# Patient Record
(demographics unavailable — no encounter records)

---

## 2017-07-03 NOTE — ERD
ER Documentation


Chief Complaint


Date/Time


DATE: 7/3/17 


TIME: 14:19


Chief Complaint


urinary retention , blood in urine with abd pain since yesterday





HPI


Patient is a 60-year-old male with no medical problems who presents with blood 

in his urine and difficulty with urination.  The patient says "I cannot pee".  

He said that he noticed blood in his urine on Saturday and then last night he 

could not have a urination.  He was unable to urinate today either and feels 

like his bladder is full.  He is having pain over his bladder.  He has no 

fevers.  He tried "a pill" which he believes was Flomax that he got from a 

friend.  Upon review of old medical records this is the patient's first visit 

to the emergency department.  He goes to a local clinic for his care.





ROS


All systems reviewed and are negative except as per history of present illness.





Medications


Home Meds


Active Scripts


Ciprofloxacin Hcl* (Ciprofloxacin Hcl*) 500 Mg Tablet, 500 MG PO BID for 7 Days

, TAB


   Prov:TRAVIS EDWARD MD         7/3/17


Tamsulosin Hcl* (Flomax*) 0.4 Mg Cap.er.24h, 0.4 MG PO QPM, #30 CAP


   Prov:TRAVIS EDWARD MD         7/3/17





PMhx/Soc


Medical and Surgical Hx:  pt denies Medical Hx, pt denies Surgical Hx


Hx Alcohol Use:  No


Hx Substance Use:  No


Hx Tobacco Use:  No


Smoking Status:  Never smoker





FmHx


Family History:  No diabetes





Physical Exam


Vitals





Vital Signs








  Date Time  Temp Pulse Resp B/P Pulse Ox O2 Delivery O2 Flow Rate FiO2


 


7/3/17 12:50 98.1 80 18 161/90 98   








Physical Exam


Const: Moderate distress secondary to pain.


Head:   Atraumatic 


Eyes:    Normal Conjunctiva


ENT:    Normal External Ears, Nose and Mouth.


Neck:               Full range of motion..~ No meningismus.


Resp:    Clear to auscultation bilaterally


Cardio:    Regular rate and rhythm, no murmurs


Abd:    Soft, tenderness over the bladder with fullness


Skin:    No petechiae or rashes


Back:    No midline or flank tenderness


Ext:    No cyanosis, or edema


Neur:    Awake and alert


Psych:    Normal Mood and Affect


Results 24 hrs





 Laboratory Tests








Test


  7/3/17


13:40


 


Urine Color RED 


 


Urine Clarity


  SLIGHTLY


CLOUDY


 


Urine pH 6.0 


 


Urine Specific Gravity 1.010 


 


Urine Ketones NEGATIVEmg/dL 


 


Urine Nitrite NEGATIVEmg/dL 


 


Urine Bilirubin NEGATIVEmg/dL 


 


Urine Urobilinogen NEGATIVEmg/dL 


 


Urine Leukocyte Esterase NEGATIVELeu/ul 


 


Urine Microscopic RBC 73/HPF 


 


Urine Microscopic WBC 3/HPF 


 


Urine Bacteria FEW/HPF 


 


Urine Hemoglobin 2+mg/dL 


 


Urine Glucose NEGATIVEmg/dL 


 


Urine Total Protein 2+mg/dl 








 Current Medications








 Medications


  (Trade)  Dose


 Ordered  Sig/Kylee


 Route


 PRN Reason  Start Time


 Stop Time Status Last Admin


Dose Admin


 


 Ciprofloxacin


  (Cipro)  500 mg  ONCE  ONCE


 PO


   7/3/17 13:30


 7/3/17 13:31 DC 7/3/17 13:43


 











Procedures/MDM


Urinalysis shows hematuria and mild infection.  Urine culture is pending.





Patient is a 60-year-old male who presents with hematuria and urinary 

retention.  A Schmitt catheter was placed and the patient feels much better.  The 

patient will be given Cipro.  I believe this is likely BPH but there is a 

possibility of prostate cancer so he will need follow-up with his primary 

doctor as well as urology.  He can return for any worsening symptoms.  I 

believe outpatient management is appropriate.  He will be sent home with a leg 

bag.





Departure


Diagnosis:  


 Primary Impression:  


 Urine retention


 Additional Impression:  


 Hematuria


Condition:  Fair


Patient Instructions:  Hematuria, Urinary Retention, Male


Referrals:  


Your doctor








KENNY LUGO MD





Additional Instructions:  


Llame al doctor RADHA y barbi holger MAMADOU PARA DENTRO DE 1-2 HAMPTON.Dgale a la 

secretaria que nosotros le instruimos hacer esta mamadou.Avise o llame si jerry 

condicin se empeora antes de la mamadou. Regresa aqui si peor o no mejor.











TRAVIS EDWARD MD Jul 3, 2017 14:21

## 2017-07-04 NOTE — ERD
ER Documentation


Chief Complaint


Date/Time


DATE: 7/4/17 


TIME: 23:29


Chief Complaint


urine not draining in urine bag,pelvic pressure pain





HPI


This is a 60-year-old male who is brought in for his urine bag not draining.  

No fevers no chills.  No nausea no vomiting.  No other current complaints.  

Patient has Schmitt catheter in place





ROS


All systems reviewed and are negative except as per history of present illness.





Medications


Home Meds


Active Scripts


Ciprofloxacin Hcl* (Ciprofloxacin Hcl*) 500 Mg Tablet, 500 MG PO BID for 7 Days

, TAB


   Prov:TRAVIS EDWARD MD         7/3/17


Tamsulosin Hcl* (Flomax*) 0.4 Mg Cap.er.24h, 0.4 MG PO QPM, #30 CAP


   Prov:TRAVIS EDWARD MD         7/3/17





Allergies


Allergies:  


Coded Allergies:  


     No Known Allergy (Unverified , 7/4/17)





PMhx/Soc


History of Surgery:  No


Anesthesia Reaction:  No


Hx Neurological Disorder:  No


Hx Respiratory Disorders:  No


Hx Cardiac Disorders:  No


Hx Psychiatric Problems:  No


Hx Miscellaneous Medical Probl:  Yes (prostate problem)


Hx Alcohol Use:  Yes (quit 20 years ago)


Hx Substance Use:  No


Hx Tobacco Use:  Yes (quit 30 years ago)


Smoking Status:  Former smoker





Physical Exam


Vitals





Vital Signs








  Date Time  Temp Pulse Resp B/P Pulse Ox O2 Delivery O2 Flow Rate FiO2


 


7/4/17 22:46 97.2 81 18 138/89 97   








Physical Exam


Const:  []


Head:   Atraumatic 


Eyes:    Normal Conjunctiva


ENT:    Normal External Ears, Nose and Mouth.


Neck:               Full range of motion..~ No meningismus.


Resp:    Clear to auscultation bilaterally


Cardio:    Regular rate and rhythm, no murmurs


Abd:    Soft, non tender, non distended. Normal bowel sounds


Skin:    No petechiae or rashes


Back:    No midline or flank tenderness


Ext:    No cyanosis, or edema


Neur:    Awake and alert


Psych:    Normal Mood and Affect





Procedures/MDM


Medical decision-making: Patient here for urinary retention.  Schmitt catheter 

changed out.  Not draining.  Patient will be discharged home on Cipro pending 

culture results.  Follow-up with PCP per





Departure


Diagnosis:  


 Primary Impression:  


 Retention of urine


Condition:  Stable











WILNER DOOLEY Jul 4, 2017 23:30

## 2017-07-23 NOTE — ERD
ER Documentation


Chief Complaint


Date/Time


DATE: 7/23/17


Chief Complaint


Urinary retention





HPI


The patient is a 60-year-old male with a history of benign prostatic 

hyperplasia who presents to the Emergency Department with complaint of urinary 

retention. The patient was initially seen in the emergency department on 07/03/ 2017 after onset of urinary retention and hematuria. At the time patient was 

noted to have a urinary tract infection and urinary retention, and therefore a 

Schmitt catheter was placed. The patient was ultimately discharged home with a 

prescription for ciprofloxacin and Flomax, and advised to follow-up with his 

primary medical provider and urologist as an outpatient. The patient returned 

on 07/10/2017, several hours after his Schmitt catheter had been removed. He had 

developed recurrent urinary retention, and therefore a Schmitt catheter was again 

placed. The patient followed up with his urologist (Dr. Joe Gil) this 

past Thursday, 07/20/2017, and his Schmitt catheter was again removed. The 

patient notes no symptoms until last night, when he began to experience 

increased urinary hesitancy and development of urinary retention. He notes that 

he tried to urinate almost 20 times last night, with no significant urine 

production. Since, he has developed progressively worsening pressure-like 

sensation to the suprapubic abdomen, and urinary retention. He denies any 

hematuria or flank pain. Denies fevers, sweats, chills, nausea or vomiting. The 

patient notes that he regularly takes Flomax and Finasteride, which he was just 

prescribed. No other complaints at this time.





ROS


All systems reviewed and are negative except as per history of present illness.





Medications


Home Meds


Active Scripts


Cephalexin* (Keflex*) 500 Mg Capsule, 500 MG PO QID for 7 Days, CAP


   Prov:LUIS ANGEL FORD PA-C         7/23/17


Docusate Sodium* (Colace*) 100 Mg Capsule, 100 MG PO TID, #30 CAP


   Prov:WILNER DOOLEY         7/11/17


Ciprofloxacin Hcl* (Ciprofloxacin Hcl*) 500 Mg Tablet, 500 MG PO BID for 7 Days

, TAB


   Prov:TRAVIS EDWARD MD         7/3/17


Tamsulosin Hcl* (Flomax*) 0.4 Mg Cap.er.24h, 0.4 MG PO QPM, #30 CAP


   Prov:TRAVIS EDWARD MD         7/3/17





Allergies


Allergies:  


Coded Allergies:  


     No Known Allergy (Unverified , 7/23/17)





PMhx/Soc


History of Surgery:  No


Anesthesia Reaction:  No


Hx Neurological Disorder:  No


Hx Respiratory Disorders:  No


Hx Cardiac Disorders:  No


Hx Psychiatric Problems:  No


Hx Miscellaneous Medical Probl:  Yes (prostate problem)


Hx Alcohol Use:  Yes (quit 20 years ago)


Hx Substance Use:  No


Hx Tobacco Use:  Yes (quit 30 years ago)





Physical Exam


Vitals





Vital Signs








  Date Time  Temp Pulse Resp B/P Pulse Ox O2 Delivery O2 Flow Rate FiO2


 


7/23/17 14:00 98.3 92 20 123/73 98 Room Air  


 


7/23/17 10:37 97.8 99 18 130/87 97   








Physical Exam


GENERAL: Well-developed, well-nourished, male. Appears uncomfortable.


HEENT: Head is normocephalic, atraumatic. No scleral pallor or icterus. 

Conjunctiva pink. Moist mucous membranes.


NECK: Supple. 


RESPIRATORY: Lungs are clear to auscultation bilaterally. Equal breath sounds.  

Normal expiratory effort. 


CARDIOVASCULAR:  Regular rate and rhythm. S1 and S2 normal.   


GASTROINTESTINAL: Abdomen is soft and non-distended.  Palpable bladder 

fullness. No guarding, no rebound tenderness. Normal bowel sounds. 


FLANK: No CVA tenderness.


BACK:  No midline tenderness. No saddle region anesthesia.


EXTREMITIES: No clubbing, cyanosis, or edema. Normal skin perfusion. Moving all 

extremities. Muscle tone is normal.  No focal swelling or erythema. 


NEUROLOGIC: The patient is alert, awake, and oriented x 3. No focal neurologic 

deficits. 


INTEGUMENT:  Skin is intact. Warm and dry. 


PSYCHIATRIC:  Cooperative, appropriate.


Result Diagram:  


7/23/17 1110                                                                   

             7/23/17 1110





Results 24 hrs





 Laboratory Tests








Test


  7/23/17


11:10 7/23/17


12:00


 


White Blood Count 15.010^3/ul  


 


Red Blood Count 4.5310^6/ul  


 


Hemoglobin 14.5g/dl  


 


Hematocrit 42.4%  


 


Mean Corpuscular Volume 93.6fl  


 


Mean Corpuscular Hemoglobin 32.0pg  


 


Mean Corpuscular Hemoglobin


Concent 34.2g/dl 


  


 


 


Red Cell Distribution Width 12.5%  


 


Platelet Count 03122^3/UL  


 


Mean Platelet Volume 10.6fl  


 


Neutrophils % 86.5%  


 


Lymphocytes % 6.4%  


 


Monocytes % 6.5%  


 


Eosinophils % 0.0%  


 


Basophils % 0.3%  


 


Nucleated Red Blood Cells % 0.0/100WBC  


 


Neutrophils # 13.010^3/ul  


 


Lymphocytes # 1.010^3/ul  


 


Monocytes # 1.010^3/ul  


 


Eosinophils # 0.010^3/ul  


 


Basophils # 0.010^3/ul  


 


Nucleated Red Blood Cells # 0.010^3/ul  


 


Sodium Level 136mmol/L  


 


Potassium Level 4.7mmol/L  


 


Chloride Level 95mmol/L  


 


Carbon Dioxide Level 29mmol/L  


 


Anion Gap 17  


 


Blood Urea Nitrogen 15mg/dl  


 


Creatinine 0.83mg/dl  


 


Glucose Level 123mg/dl  


 


Calcium Level 9.0mg/dl  


 


Total Bilirubin 1.0mg/dl  


 


Direct Bilirubin 0.00mg/dl  


 


Indirect Bilirubin 1.0mg/dl  


 


Aspartate Amino Transf


(AST/SGOT) 25IU/L 


  


 


 


Alanine Aminotransferase


(ALT/SGPT) 33IU/L 


  


 


 


Alkaline Phosphatase 80IU/L  


 


Total Protein 7.9g/dl  


 


Albumin 4.2g/dl  


 


Globulin 3.70g/dl  


 


Albumin/Globulin Ratio 1.13  


 


Urine Color  YELLOW 


 


Urine Clarity


  


  SLIGHTLY


CLOUDY


 


Urine pH  5.0 


 


Urine Specific Gravity  1.016 


 


Urine Ketones  NEGATIVEmg/dL 


 


Urine Nitrite  POSITIVEmg/dL 


 


Urine Bilirubin  NEGATIVEmg/dL 


 


Urine Urobilinogen  NEGATIVEmg/dL 


 


Urine Leukocyte Esterase  3+[place]/ul 


 


Urine Microscopic RBC  6/HPF 


 


Urine Microscopic WBC  103/HPF 


 


Urine Bacteria  MODERATE/HPF 


 


Urine Hemoglobin  3+mg/dL 


 


Urine Glucose  NEGATIVEmg/dL 


 


Urine Total Protein  NEGATIVEmg/dl 








 Current Medications








 Medications


  (Trade)  Dose


 Ordered  Sig/Kylee


 Route


 PRN Reason  Start Time


 Stop Time Status Last Admin


Dose Admin


 


 Ceftriaxone Sodium


  (Rocephin)  50 ml @ 


 100 mls/hr  ONCE  ONCE


 IVPB


   7/23/17 13:30


 7/23/17 13:59 DC 7/23/17 13:28


 


 


 Ketorolac


 Tromethamine


  (Toradol)  15 mg  ONCE  STAT


 IV


   7/23/17 13:23


 7/23/17 13:24 DC 7/23/17 13:29


 











Procedures/MDM


This is a 60-year-old male presenting to the Emergency Department with acute 

urinary retention, likely secondary to BPH. The patient has had history of 

similar episodes. He noted resolution of discomfort upon placement of the Schmitt 

catheter, with drainage of approximately 700+ cc urine. No evidence of saddle 

anesthesia, lower extremity numbness, weakness, paresthesias or decreased 

sensation. No evidence of cauda equina syndrome. Patient's symptoms likely 

secondary to BPH, as in the past. Urinalysis performed revealed positive 

nitrites, 3+ urine leukocyte esterase, 103 WBCs, consistent with urinary tract 

infection. Clinical presentation not consistent with acute pyelonephritis. 

Urine culture sent. Rocephin 1 gram administered in the ED. Otherwise, 

laboratory analysis with no evidence of acute kidney injury. At this time, the 

patient is in stable condition and therefore can be discharged home with the 

Schmitt catheter in place, a prescription for Keflex, and given strict return 

precautions for signs of deteriorating or worsening condition. The patient is 

advised to follow up with his urologist within 1-2 days for reevaluation and 

further management, or return to the ER sooner for any worsening symptoms. I 

shared my medical decision making and plan with the patient at length and in 

great detail, and the patient verbally understands and agrees with the plan for 

further observation and care as an outpatient. At the time of discharge, all 

questions were answered.





Departure


Diagnosis:  


 Primary Impression:  


 Urinary retention


 Additional Impressions:  


 Urinary tract infection


 Urinary tract infection type:  acute cystitis  Hematuria presence:  with 

hematuria  Qualified Code:  N30.01 - Acute cystitis with hematuria


 History of benign prostatic hyperplasia


Condition:  Stable


Patient Instructions:  Understanding Urinary Tract Infections (UTIs), Urinary 

Retention, Male





Additional Instructions:  


Llame al doctor MAANA y barbi holger MAMADOU PARA DENTRO DE 1-2 HAMPTON.Dgale a la 

secretaria que nosotros le instruimos hacer esta mamadou.Avise o llame si jerry 

condicin se empeora antes de la mamadou. Regresa aqui si peor o no mejor.











LUIS ANGEL FORD PA-C Jul 23, 2017 11:07

## 2017-07-23 NOTE — ERD
ER Documentation


Chief Complaint


Date/Time


DATE: 17 


TIME: 22:54


Chief Complaint


blood at tip of penis at the side of catheter insertion site





HPI


60-year-old male here for blood activities pedis at the side of the catheter 

insertion for urinary retention.  Denies fevers or chills.  Does complain of 

blood in urine.  No fevers no chills no nausea no vomiting.  No other current 

complaints.  Schmitt placed last night.





ROS


All systems reviewed and are negative except as per history of present illness.





Medications


Home Meds


Active Scripts


Cephalexin* (Keflex*) 500 Mg Capsule, 500 MG PO QID for 7 Days, CAP


   Prov:LUIS ANGEL FORD PA-C         17


Docusate Sodium* (Colace*) 100 Mg Capsule, 100 MG PO TID, #30 CAP


   Prov:WILNER DOOLEY         17


Ciprofloxacin Hcl* (Ciprofloxacin Hcl*) 500 Mg Tablet, 500 MG PO BID for 7 Days

, TAB


   Prov:TRAVIS EDWARD MD         7/3/17


Tamsulosin Hcl* (Flomax*) 0.4 Mg Cap.er.24h, 0.4 MG PO QPM, #30 CAP


   Prov:TRAVIS EDWARD MD         7/3/17





Allergies


Allergies:  


Coded Allergies:  


     No Known Allergy (Unverified , 17)





PMhx/Soc


History of Surgery:  No


Anesthesia Reaction:  No


Hx Neurological Disorder:  No


Hx Respiratory Disorders:  No


Hx Cardiac Disorders:  No


Hx Psychiatric Problems:  No


Hx Miscellaneous Medical Probl:  Yes (prostate problem)


Hx Alcohol Use:  Yes (quit 20 years ago)


Hx Substance Use:  No


Hx Tobacco Use:  Yes (quit 30 years ago)


Smoking Status:  Never smoker





Physical Exam


Vitals





Vital Signs








  Date Time  Temp Pulse Resp B/P Pulse Ox O2 Delivery O2 Flow Rate FiO2


 


17 22:12 97.8 92 18 132/77 97   








Physical Exam


Const:  []


Head:   Atraumatic 


Eyes:    Normal Conjunctiva


ENT:    Normal External Ears, Nose and Mouth.


Neck:               Full range of motion..~ No meningismus.


Resp:    Clear to auscultation bilaterally


Cardio:    Regular rate and rhythm, no murmurs


Abd:    Soft, non tender, non distended. Normal bowel sounds


Skin:    No petechiae or rashes


Back:    No midline or flank tenderness


Ext:    No cyanosis, or edema


Neur:    Awake and alert


Psych:    Normal Mood and Affect





Procedures/MDM


Medical decision-makin-year-old male with urinary retention and likely 

infection.  At this point clinically stable.  Patient will be discharged home 

on ciprofloxacin and tamsulosin pending culture results





Departure


Diagnosis:  


 Primary Impression:  


 Urinary retention


 Additional Impression:  


 UTI (urinary tract infection)


 Urinary tract infection type:  acute cystitis  Hematuria presence:  without 

hematuria  Qualified Code:  N30.00 - Acute cystitis without hematuria


Condition:  Stable











WILNER DOOLEY 2017 22:55

## 2017-07-25 NOTE — ERA
ER Documentation


Chief Complaint


Date/Time


DATE: 7/25/17 


TIME: 18:37


Chief Complaint


bilateral testicular pain since yesterday





HPI


This is a 60-year-old male presenting with a chief complaint of scrotal pain 1 

day.  Patient had a catheter placed on Monday.  I had a scheduled appointment 

with urologist today but the urologist was not there and was told if he was 

having pain to go to the emergency department.  Patient is on antibiotics.  

Patient's urinary catheter was originally placed for urinary retention.  Patient

's history.  Pain is worse with laying and is relieved with slowly walking.  

Patient has taken Tylenol with little relief.  Patient denies fever, chills, 

current dysuria, pruritus.  Patient has no other complaints and describes no 

other associated manifestations.  Vaccination status up-to-date and denies any 

recent travel.





ROS


All systems reviewed and are negative except as per history of present illness.





Medications


Home Meds


Active Scripts


Ciprofloxacin Hcl* (Ciprofloxacin Hcl*) 500 Mg Tablet, 500 MG PO BID for 14 Days

, TAB


   Prov:LEANDRA AMBRIZ PA-C         7/25/17


Ciprofloxacin Hcl* (Ciprofloxacin Hcl*) 500 Mg Tablet, 500 MG PO BID for 14 Days

, TAB


   Prov:WILNER DOOLEY S.         7/23/17


Cephalexin* (Keflex*) 500 Mg Capsule, 500 MG PO QID for 7 Days, CAP


   Prov:LUIS ANGEL FORD PA-C         7/23/17


Docusate Sodium* (Colace*) 100 Mg Capsule, 100 MG PO TID, #30 CAP


   Prov:WILNER DOOLEY S.         7/11/17


Ciprofloxacin Hcl* (Ciprofloxacin Hcl*) 500 Mg Tablet, 500 MG PO BID for 7 Days

, TAB


   Prov:TRAVIS EDWARD MD         7/3/17


Tamsulosin Hcl* (Flomax*) 0.4 Mg Cap.er.24h, 0.4 MG PO QPM, #30 CAP


   Prov:TRAVIS EDWARD MD         7/3/17





Allergies


Allergies:  


Coded Allergies:  


     No Known Allergy (Unverified , 7/23/17)





PMhx/Soc


History of Surgery:  No


Anesthesia Reaction:  No


Hx Neurological Disorder:  No


Hx Respiratory Disorders:  No


Hx Cardiac Disorders:  No


Hx Psychiatric Problems:  No


Hx Miscellaneous Medical Probl:  Yes (prostate problem)


Hx Alcohol Use:  Yes (quit 20 years ago)


Hx Substance Use:  No


Hx Tobacco Use:  Yes (quit 30 years ago)





Physical Exam


Vitals





Vital Signs








  Date Time  Temp Pulse Resp B/P Pulse Ox O2 Delivery O2 Flow Rate FiO2


 


7/25/17 18:12 100.3 98 22 122/78 96   








Physical Exam


Const: Overweight 60-year-old male in no acute distress he was lying down in 

the gurney on initial presentation


Head:   Atraumatic 


Eyes:    Normal Conjunctiva


ENT:    Normal External Ears, Nose and Mouth.


Neck:               Full range of motion..~ No meningismus.


Resp:    Clear to auscultation bilaterally


Cardio:    Regular rate and rhythm, no murmurs


Abd:    Soft, non tender, non distended. Normal bowel sounds


Skin:    No petechiae or rashes


Back:    No midline or flank tenderness


Ext:    No cyanosis, or edema


Neur:    Awake and alert


Psych:    Normal Mood and Affect





:          Erythematous, non-translucent, tender scrotum.  Negative phren 

sign.  Positive cremasteric reflex.


Results 24 hrs








 Laboratory Tests








Test


  7/25/17


19:00


 


Urine Color YELLOW 


 


Urine Clarity


  SLIGHTLY


CLOUDY


 


Urine pH 5.0 


 


Urine Specific Gravity 1.009 


 


Urine Ketones NEGATIVEmg/dL 


 


Urine Nitrite NEGATIVEmg/dL 


 


Urine Bilirubin NEGATIVEmg/dL 


 


Urine Urobilinogen NEGATIVEmg/dL 


 


Urine Leukocyte Esterase 2+[place]/ul 


 


Urine Microscopic RBC 5/HPF 


 


Urine Microscopic WBC 19/HPF 


 


Urine Bacteria FEW/HPF 


 


Urine Mucus FEW/HPF 


 


Urine Hemoglobin 3+mg/dL 


 


Urine Glucose NEGATIVEmg/dL 


 


Urine Total Protein 1+mg/dl 








 Current Medications








 Medications


  (Trade)  Dose


 Ordered  Sig/Kylee


 Route


 PRN Reason  Start Time


 Stop Time Status Last Admin


Dose Admin


 


 Ketorolac


 Tromethamine


  (Toradol)  15 mg  ONCE  STAT


 IM


   7/25/17 18:35


 7/25/17 18:37 DC 7/25/17 19:01


 














Procedures/MDM


This is a 6-year-old male who had a catheter placed 2 days ago and has been 

having bilateral scrotal pain today.  Physical exam showed a negative phren 

sign and positive cremasteric reflex.  Ultrasound however was still ordered to 

rule out torsion and other anatomical pathologies.  Patient is on 

ciprofloxacin.  I have little suspicion palpation of scrotum was not consistent 

with appreciable varicocele.  Scrotum was translucent and not consistent with 

hydrocele.  Patient was given 15 mg of ketorolac in the ED with moderate relief 

of symptoms.  Other pain medications was told to more adequate diagnosis was 

confirmed.  Ultrasound was read by the radiologist given the following 

impression:


1.  Sonographic findings suggestive of bilateral epididymitis as well as left 

orchitis as detailed above.


2.  Small right hydrocele.


3.  Bilateral varicoceles.


Spoke to my attending Dr. Edward about this case was recommended continuation 

of antibiotics and follow-up with urologist within the next 1-2 days.  Patient 

has been recommended to take ibuprofen for the pain as well as use of tight 

underwear or jockstrap.


I have spoke further with the patient regarding their condition and future 

management. They have verbally responded that they understand their status and 

treatment plan. The patients vitals are stable, and their current condition is 

appropriate for discharge. The patient will be given discharge instructions 

with return precautions.





Departure


Diagnosis:  


 Primary Impression:  


 Orchitis and epididymitis


 Additional Impression:  


 Epididymitis


Condition:  Stable





Additional Instructions:  


Follow-up with urologist within the next 1-2 days. Return the the emergency 

department immediately if symptoms worsen or change. If you have any questions 

regarding medications, ask your pharmacist or us before you leave. If any 

adverse reactions occur while taking your medications, discontinue the 

treatment and return to the emergency department immediately.  Take your 

medications as directed, and complete the entire course of treatment.











LEANDRA AMBRIZ PA-C Jul 25, 2017 18:42

## 2017-07-25 NOTE — RADRPT
PROCEDURE:   Scrotal ultrasound 

 

CLINICAL INDICATION:   Testicular pain 

 

TECHNIQUE:   Multiple gray scale, color Doppler, and spectral Doppler images of the scrotum were obt
ained.  Images were reviewed on a high-resolution PACS workstation. 

 

COMPARISON:   None 

 

FINDINGS:

The right testes measures 3.7 x 2.5 x 3.2 cm and the left testes measures 4 x 2.3 x 2.8 cm. The righ
t testes is normal in size and echogenicity with normal color flow. Normal echogenicity in the left 
testes is seen.  Increased color flow in the left testes is identified. The right epididymis measure
s 14 x 6.4 mm and the left epididymis measures 14.4 mm.  The epididymis bilaterally are heterogeneou
s with increased color flow.  Bilateral varicoceles are seen.  A small right hydrocele is seen.

 

IMPRESSION:

 

1.  Sonographic findings suggestive of bilateral epididymitis as well as left orchitis as detailed a
juan a.

2.  Small right hydrocele.

3.  Bilateral varicoceles.

 

RPTAT: HPNM

_____________________________________________ 

Physician Jose           Date    Time 

Electronically viewed and signed by Physician Jose on 07/25/2017 19:19 

 

D:  07/25/2017 19:19  T:  07/25/2017 19:19

PM/